# Patient Record
Sex: FEMALE | Race: OTHER | HISPANIC OR LATINO | Employment: UNEMPLOYED | URBAN - METROPOLITAN AREA
[De-identification: names, ages, dates, MRNs, and addresses within clinical notes are randomized per-mention and may not be internally consistent; named-entity substitution may affect disease eponyms.]

---

## 2023-08-28 ENCOUNTER — HOSPITAL ENCOUNTER (EMERGENCY)
Facility: HOSPITAL | Age: 38
Discharge: HOME | End: 2023-08-28
Attending: STUDENT IN AN ORGANIZED HEALTH CARE EDUCATION/TRAINING PROGRAM

## 2023-08-28 VITALS
RESPIRATION RATE: 16 BRPM | WEIGHT: 134.5 LBS | HEART RATE: 73 BPM | BODY MASS INDEX: 27.12 KG/M2 | TEMPERATURE: 97 F | SYSTOLIC BLOOD PRESSURE: 118 MMHG | DIASTOLIC BLOOD PRESSURE: 68 MMHG | OXYGEN SATURATION: 99 % | HEIGHT: 59 IN

## 2023-08-28 DIAGNOSIS — Z34.91 FIRST TRIMESTER PREGNANCY: Primary | ICD-10-CM

## 2023-08-28 DIAGNOSIS — R10.31 RIGHT LOWER QUADRANT ABDOMINAL PAIN: ICD-10-CM

## 2023-08-28 LAB
ABO + RH BLD: NORMAL
ALBUMIN SERPL BCP-MCNC: 3.7 G/DL (ref 3.5–5.2)
ALP SERPL-CCNC: 78 U/L (ref 55–135)
ALT SERPL W/O P-5'-P-CCNC: 12 U/L (ref 10–44)
ANION GAP SERPL CALC-SCNC: 9 MMOL/L (ref 8–16)
AST SERPL-CCNC: 18 U/L (ref 10–40)
B-HCG UR QL: POSITIVE
BASOPHILS # BLD AUTO: 0.03 K/UL (ref 0–0.2)
BASOPHILS NFR BLD: 0.2 % (ref 0–1.9)
BILIRUB SERPL-MCNC: 0.3 MG/DL (ref 0.1–1)
BUN SERPL-MCNC: 9 MG/DL (ref 6–20)
CALCIUM SERPL-MCNC: 9 MG/DL (ref 8.7–10.5)
CHLORIDE SERPL-SCNC: 105 MMOL/L (ref 95–110)
CO2 SERPL-SCNC: 23 MMOL/L (ref 23–29)
CREAT SERPL-MCNC: 0.7 MG/DL (ref 0.5–1.4)
CTP QC/QA: YES
DIFFERENTIAL METHOD: ABNORMAL
EOSINOPHIL # BLD AUTO: 0.2 K/UL (ref 0–0.5)
EOSINOPHIL NFR BLD: 1.4 % (ref 0–8)
ERYTHROCYTE [DISTWIDTH] IN BLOOD BY AUTOMATED COUNT: 15.1 % (ref 11.5–14.5)
EST. GFR  (NO RACE VARIABLE): >60 ML/MIN/1.73 M^2
GLUCOSE SERPL-MCNC: 81 MG/DL (ref 70–110)
HCG INTACT+B SERPL-ACNC: NORMAL MIU/ML
HCT VFR BLD AUTO: 35.2 % (ref 37–48.5)
HGB BLD-MCNC: 11.2 G/DL (ref 12–16)
IMM GRANULOCYTES # BLD AUTO: 0.05 K/UL (ref 0–0.04)
IMM GRANULOCYTES NFR BLD AUTO: 0.4 % (ref 0–0.5)
LYMPHOCYTES # BLD AUTO: 2.1 K/UL (ref 1–4.8)
LYMPHOCYTES NFR BLD: 16.4 % (ref 18–48)
MCH RBC QN AUTO: 25.6 PG (ref 27–31)
MCHC RBC AUTO-ENTMCNC: 31.8 G/DL (ref 32–36)
MCV RBC AUTO: 80 FL (ref 82–98)
MONOCYTES # BLD AUTO: 1 K/UL (ref 0.3–1)
MONOCYTES NFR BLD: 7.8 % (ref 4–15)
NEUTROPHILS # BLD AUTO: 9.6 K/UL (ref 1.8–7.7)
NEUTROPHILS NFR BLD: 73.8 % (ref 38–73)
NRBC BLD-RTO: 0 /100 WBC
PLATELET # BLD AUTO: 332 K/UL (ref 150–450)
PMV BLD AUTO: 9.3 FL (ref 9.2–12.9)
POTASSIUM SERPL-SCNC: 3.7 MMOL/L (ref 3.5–5.1)
PROT SERPL-MCNC: 7.4 G/DL (ref 6–8.4)
RBC # BLD AUTO: 4.38 M/UL (ref 4–5.4)
SODIUM SERPL-SCNC: 137 MMOL/L (ref 136–145)
WBC # BLD AUTO: 13.04 K/UL (ref 3.9–12.7)

## 2023-08-28 PROCEDURE — 86901 BLOOD TYPING SEROLOGIC RH(D): CPT | Performed by: STUDENT IN AN ORGANIZED HEALTH CARE EDUCATION/TRAINING PROGRAM

## 2023-08-28 PROCEDURE — 25000003 PHARM REV CODE 250: Performed by: EMERGENCY MEDICINE

## 2023-08-28 PROCEDURE — 84702 CHORIONIC GONADOTROPIN TEST: CPT | Performed by: EMERGENCY MEDICINE

## 2023-08-28 PROCEDURE — 80053 COMPREHEN METABOLIC PANEL: CPT | Performed by: EMERGENCY MEDICINE

## 2023-08-28 PROCEDURE — 99285 EMERGENCY DEPT VISIT HI MDM: CPT | Mod: 25

## 2023-08-28 PROCEDURE — 81025 URINE PREGNANCY TEST: CPT | Performed by: EMERGENCY MEDICINE

## 2023-08-28 PROCEDURE — 96360 HYDRATION IV INFUSION INIT: CPT

## 2023-08-28 PROCEDURE — 85025 COMPLETE CBC W/AUTO DIFF WBC: CPT | Performed by: EMERGENCY MEDICINE

## 2023-08-28 RX ORDER — ACETAMINOPHEN 500 MG
1000 TABLET ORAL
Status: COMPLETED | OUTPATIENT
Start: 2023-08-28 | End: 2023-08-28

## 2023-08-28 RX ADMIN — SODIUM CHLORIDE 1000 ML: 9 INJECTION, SOLUTION INTRAVENOUS at 08:08

## 2023-08-28 RX ADMIN — ACETAMINOPHEN 1000 MG: 500 TABLET ORAL at 06:08

## 2023-08-28 NOTE — ED TRIAGE NOTES
Pt reports to ED for RLQ x 10 days but pain today unbearable. + home UPT, LMP 07/10/23. Reports brown/reddish vaginal discharge. Denies N/V/D, or fever.   Pt denies any other symptoms.   Pt [id number]

## 2023-08-29 NOTE — DISCHARGE INSTRUCTIONS
Doni por venir a nuestro Departamento de Emergencias hocorinne. Es importante recordar que algunos problemas o condiciones médicas son difíciles de diagnosticar y es posible que no se encuentren jayna rangel visita al Departamento de Emergencias.    Asegúrese de hacer un seguimiento con rangel médico de atención primaria y revisar con ellos todos los laboratorios/imágenes/pruebas que se realizaron jayna rangel visita a la naty de emergencias. Algunos laboratorios/pruebas pueden estar fuera del rango normal y requieren un seguimiento que no sea de emergencia y mike mayor investigación para ayudar a diagnosticar/excluir/prevenir complicaciones u otras afecciones médicas potencialmente graves que no se abordaron jayna rangel visita a la naty de emergencias.    Si no tiene un médico de atención primaria, puede comunicarse con el que figura en rangel documentación de idania o también puede llamar al mostrador de citas de la [facility] al [phone] para programar mike marifer y establecer atención con marc. Otros recursos para encontrar médicos de atención primaria: [email or web address] Es importante para rangel franklin que tenga un médico de atención primaria.    Capitanejo todos los medicamentos según las indicaciones. Todos los medicamentos pueden tener efectos secundarios y es imposible predecir qué medicamentos pueden causar efectos secundarios o qué efectos secundarios (si los hay) le darán. Si siente que está teniendo un efecto negativo o un efecto secundario de algún medicamento, debe dejar de tomarlo inmediatamente y buscar atención médica. Si siente que está teniendo mike reacción potencialmente mortal, llame al 911.    Regrese a la naty de emergencias si tiene preguntas/inquietudes, síntomas nuevos/preocupantes, empeoramiento o falta de mejora.    No conduzca, nade, suba a [place], se bañe, opere maquinaria pesada, rakesh alcohol o tome medicamentos potencialmente sedantes, firme ningún documento legal o tome decisiones importantes jayna  24 horas si ha recibido algún medicamento para el dolor, sedantes o alteradores del estado de ánimo. medicamentos jayna rangel visita a la naty de emergencias o dentro de las 24 horas de haberlos tomado si se los lipscomb recetado.    Puede encontrar recursos adicionales para dentistas, audífonos, equipos médicos duraderos, farmacias de bajo costo y otros recursos en [email or web address]

## 2023-08-29 NOTE — ED PROVIDER NOTES
Encounter Date: 8/28/2023       History     Chief Complaint   Patient presents with    Abdominal Cramping     RLQ x several days. + home UPT, LMP 07/10/23. Reports brown vaginal discharge. Denies N/V/D, or fever.      37-year-old female presents for evaluation of positive home pregnancy test, scant brown vaginal discharge, and constant right lower quadrant abdominal pain ongoing for weeks.  She reported symptoms lasting for days but I clarified that they lasted for many weeks.  The pain is worse with movement and relieved with rest.  She denies nausea or vomiting.  She denies dysuria hematuria urgency or frequency.  She denies any change in bowel movements.  Last menstrual period was 07/10/2023      Review of patient's allergies indicates:  No Known Allergies  History reviewed. No pertinent past medical history.  History reviewed. No pertinent surgical history.  History reviewed. No pertinent family history.  Social History     Tobacco Use    Smoking status: Former     Types: Cigarettes    Smokeless tobacco: Never   Substance Use Topics    Drug use: Never     Review of Systems   Constitutional:  Negative for activity change, appetite change, chills, fever and unexpected weight change.   HENT:  Negative for dental problem and drooling.    Eyes:  Negative for discharge and itching.   Respiratory:  Negative for cough, chest tightness, shortness of breath, wheezing and stridor.    Cardiovascular:  Negative for chest pain, palpitations and leg swelling.   Gastrointestinal:  Positive for abdominal pain. Negative for abdominal distention, diarrhea and nausea.   Genitourinary:  Negative for difficulty urinating, dysuria, frequency and urgency.   Musculoskeletal:  Negative for back pain, gait problem and joint swelling.   Neurological:  Negative for dizziness, syncope, numbness and headaches.   Psychiatric/Behavioral:  Negative for agitation, behavioral problems and confusion.        Physical Exam     Initial Vitals  [08/28/23 1615]   BP Pulse Resp Temp SpO2   (!) 142/63 94 19 98.9 °F (37.2 °C) 100 %      MAP       --         Physical Exam    Nursing note and vitals reviewed.  Constitutional: She appears well-developed and well-nourished. She is not diaphoretic.   HENT:   Head: Normocephalic and atraumatic.   Mouth/Throat: Oropharynx is clear and moist.   Eyes: EOM are normal. Pupils are equal, round, and reactive to light. Right eye exhibits no discharge. Left eye exhibits no discharge.   Neck: No tracheal deviation present.   Normal range of motion.  Cardiovascular:  Normal rate, regular rhythm and intact distal pulses.           Pulmonary/Chest: No respiratory distress. She has no wheezes. She exhibits no tenderness.   Abdominal: Abdomen is soft. She exhibits no distension. There is no abdominal tenderness.   Musculoskeletal:         General: No tenderness or edema. Normal range of motion.      Cervical back: Normal range of motion.     Neurological: She is alert and oriented to person, place, and time. She has normal strength. No cranial nerve deficit or sensory deficit. GCS eye subscore is 4. GCS verbal subscore is 5. GCS motor subscore is 6.   Skin: Skin is warm and dry. No rash noted.   Psychiatric: She has a normal mood and affect. Her behavior is normal. Thought content normal.         ED Course   Procedures  Labs Reviewed   CBC W/ AUTO DIFFERENTIAL - Abnormal; Notable for the following components:       Result Value    WBC 13.04 (*)     Hemoglobin 11.2 (*)     Hematocrit 35.2 (*)     MCV 80 (*)     MCH 25.6 (*)     MCHC 31.8 (*)     RDW 15.1 (*)     Gran # (ANC) 9.6 (*)     Immature Grans (Abs) 0.05 (*)     Gran % 73.8 (*)     Lymph % 16.4 (*)     All other components within normal limits    Narrative:     Release to patient->Immediate   POCT URINE PREGNANCY - Abnormal; Notable for the following components:    POC Preg Test, Ur Positive (*)     All other components within normal limits   COMPREHENSIVE METABOLIC PANEL     Narrative:     Release to patient->Immediate   HCG, QUANTITATIVE    Narrative:     Release to patient->Immediate   GROUP & RH          Imaging Results              US OB <14 Wks TransAbd & TransVag, Single Gestation (XPD) (Final result)  Result time 08/28/23 20:18:25      Final result by Salvatore Sun MD (08/28/23 20:18:25)                   Impression:      Single intrauterine pregnancy which corresponds to a 7 weeks 2 days gestation by crown rump length. Fetal heart rate is 152 BPM.  TLAA by ultrasound 04/13/2024.      Electronically signed by: Salvatore Sun MD  Date:    08/28/2023  Time:    20:18               Narrative:    EXAMINATION:  US OB <14 WEEKS, TRANSABDOM & TRANSVAG, SINGLE GESTATION (XPD)    CLINICAL HISTORY:  RLQ abdominal pain;    TECHNIQUE:  Transabdominal sonography of the pelvis was performed, followed by transvaginal sonography to better evaluate the uterus and ovaries.    COMPARISON:  None.    FINDINGS:  The uterus measures 10 x 7 x 7 cm. Uterine parenchyma is heterogenous in echotexture. In the uterine cavity there is a gestational sac with a mean diameter of 2.4 cm. The fetal pole measures 0.9 cm by crown rump length and corresponds to a 7 weeks 2 days gestation. Fetal heart rate is 152 BPM. The yolk sac measures 0.3 cm.    The right ovary measures 2 x 2 x 2 cm. The left ovary measures 4 x 2 x 3 cm. Arterial and venous flow are preserved bilaterally. A suspected corpus luteum cyst measuring 2 cm is seen in the left ovary. No significant free fluid is seen.                                       US Abdomen Limited (Final result)  Result time 08/28/23 20:07:10      Final result by Salvatore Sun MD (08/28/23 20:07:10)                   Impression:      No significant abnormalities identified.      Electronically signed by: Salvatore Sun MD  Date:    08/28/2023  Time:    20:07               Narrative:    EXAMINATION:  US ABDOMEN LIMITED    CLINICAL HISTORY:  RLQ  pain;    TECHNIQUE:  Limited ultrasound of the right lower quadrant of the abdomen was performed with graded compression in the expected location of the appendix.    COMPARISON:  None    FINDINGS:  Limited ultrasound evaluation was performed of the right lower quadrant region of concern.  No significant abnormalities are seen in the region.  Appendix was not visualized.  No free fluid or focal fluid collection seen.  Normal bowel peristalsis is observed.                                       Medications   sodium chloride 0.9% bolus 1,000 mL 1,000 mL (0 mLs Intravenous Stopped 8/28/23 2110)   acetaminophen tablet 1,000 mg (1,000 mg Oral Given 8/28/23 1841)     Medical Decision Making  Amount and/or Complexity of Data Reviewed  Labs: ordered.                             37-year-old female presents for right lower quadrant abdominal pain ongoing for months and a positive home pregnancy test.  Vitals within normal limits.  Abdomen is soft and nontender, doubt acute intra-abdominal infection.  Doubt appendicitis.  Duration of symptoms more suggestive of round ligament pain given pain ongoing for weeks. White count within normal limits.  CMP within normal limits.  Transvaginal ultrasound shows single live intrauterine pregnancy with fetal heart tones of 145 and estimated gestational age of 7 weeks 0 days.  Patient feeling well on my reexamination.  She is no longer having abdominal pain.  Stable for discharge with outpatient follow-up and return precautions for worsening symptoms.      Clinical Impression:   Final diagnoses:  [Z34.91] First trimester pregnancy (Primary)  [R10.31] Right lower quadrant abdominal pain        ED Disposition Condition    Discharge Stable          ED Prescriptions    None       Follow-up Information       Follow up With Specialties Details Why Contact Info    [facility] - Emergency Dept Emergency Medicine  Ir si los vivian empeorar [address]  [id number]  [phone]    [place] [facility] - Obstetrics and Gynecology  [facility] para establecer un doctor obstetrico [address]  [place] [address]  [phone]      [place] [facility] -   [facility] para establecer un doctor obstetrico [address]  [place] [address]  [phone]               Juve Oviedo MD  08/28/23 2566